# Patient Record
Sex: MALE | Race: WHITE | Employment: FULL TIME | URBAN - METROPOLITAN AREA
[De-identification: names, ages, dates, MRNs, and addresses within clinical notes are randomized per-mention and may not be internally consistent; named-entity substitution may affect disease eponyms.]

---

## 2020-10-21 ENCOUNTER — APPOINTMENT (OUTPATIENT)
Dept: GENERAL RADIOLOGY | Age: 30
End: 2020-10-21
Attending: EMERGENCY MEDICINE
Payer: OTHER GOVERNMENT

## 2020-10-21 ENCOUNTER — HOSPITAL ENCOUNTER (EMERGENCY)
Age: 30
Discharge: HOME | End: 2020-10-21
Attending: EMERGENCY MEDICINE
Payer: OTHER GOVERNMENT

## 2020-10-21 VITALS
RESPIRATION RATE: 19 BRPM | TEMPERATURE: 98.7 F | HEIGHT: 71 IN | DIASTOLIC BLOOD PRESSURE: 97 MMHG | OXYGEN SATURATION: 99 % | WEIGHT: 170 LBS | SYSTOLIC BLOOD PRESSURE: 146 MMHG | HEART RATE: 58 BPM | BODY MASS INDEX: 23.8 KG/M2

## 2020-10-21 DIAGNOSIS — R07.89 ATYPICAL CHEST PAIN: Primary | ICD-10-CM

## 2020-10-21 DIAGNOSIS — R07.89 CHEST WALL PAIN: ICD-10-CM

## 2020-10-21 LAB
ALBUMIN SERPL-MCNC: 4.1 G/DL (ref 3.5–5)
ALBUMIN/GLOB SERPL: 1.2 {RATIO} (ref 1.1–2.2)
ALP SERPL-CCNC: 53 U/L (ref 45–117)
ALT SERPL-CCNC: 21 U/L (ref 12–78)
ANION GAP SERPL CALC-SCNC: 7 MMOL/L (ref 5–15)
AST SERPL W P-5'-P-CCNC: 14 U/L (ref 15–37)
ATRIAL RATE: 80 BPM
BASOPHILS # BLD: 0 K/UL (ref 0–0.1)
BASOPHILS NFR BLD: 0 % (ref 0–1)
BILIRUB SERPL-MCNC: 0.2 MG/DL (ref 0.2–1)
BUN SERPL-MCNC: 13 MG/DL (ref 6–20)
BUN/CREAT SERPL: 12 (ref 12–20)
CA-I BLD-MCNC: 8.9 MG/DL (ref 8.5–10.1)
CALCULATED P AXIS, ECG09: 69 DEGREES
CALCULATED R AXIS, ECG10: 69 DEGREES
CALCULATED T AXIS, ECG11: 53 DEGREES
CHLORIDE SERPL-SCNC: 107 MMOL/L (ref 97–108)
CO2 SERPL-SCNC: 27 MMOL/L (ref 21–32)
CREAT SERPL-MCNC: 1.09 MG/DL (ref 0.7–1.3)
DIAGNOSIS, 93000: NORMAL
DIFFERENTIAL METHOD BLD: NORMAL
EOSINOPHIL # BLD: 0.1 K/UL (ref 0–0.4)
EOSINOPHIL NFR BLD: 2 % (ref 0–7)
ERYTHROCYTE [DISTWIDTH] IN BLOOD BY AUTOMATED COUNT: 12.1 % (ref 11.5–14.5)
GLOBULIN SER CALC-MCNC: 3.3 G/DL (ref 2–4)
GLUCOSE SERPL-MCNC: 87 MG/DL (ref 65–100)
HCT VFR BLD AUTO: 42.1 % (ref 36.6–50.3)
HGB BLD-MCNC: 14.5 G/DL (ref 12.1–17)
IMM GRANULOCYTES # BLD AUTO: 0 K/UL (ref 0–0.04)
IMM GRANULOCYTES NFR BLD AUTO: 0 % (ref 0–0.5)
LYMPHOCYTES # BLD: 2.5 K/UL (ref 0.8–3.5)
LYMPHOCYTES NFR BLD: 49 % (ref 12–49)
MCH RBC QN AUTO: 30.1 PG (ref 26–34)
MCHC RBC AUTO-ENTMCNC: 34.4 G/DL (ref 30–36.5)
MCV RBC AUTO: 87.3 FL (ref 80–99)
MONOCYTES # BLD: 0.3 K/UL (ref 0–1)
MONOCYTES NFR BLD: 6 % (ref 5–13)
NEUTS SEG # BLD: 2.2 K/UL (ref 1.8–8)
NEUTS SEG NFR BLD: 43 % (ref 32–75)
P-R INTERVAL, ECG05: 142 MS
PLATELET # BLD AUTO: 245 K/UL (ref 150–400)
PMV BLD AUTO: 9.9 FL (ref 8.9–12.9)
POTASSIUM SERPL-SCNC: 3.7 MMOL/L (ref 3.5–5.1)
PROT SERPL-MCNC: 7.4 G/DL (ref 6.4–8.2)
Q-T INTERVAL, ECG07: 342 MS
QRS DURATION, ECG06: 90 MS
QTC CALCULATION (BEZET), ECG08: 394 MS
RBC # BLD AUTO: 4.82 M/UL (ref 4.1–5.7)
SODIUM SERPL-SCNC: 141 MMOL/L (ref 136–145)
TROPONIN I SERPL-MCNC: <0.05 NG/ML
VENTRICULAR RATE, ECG03: 80 BPM
WBC # BLD AUTO: 5.2 K/UL (ref 4.1–11.1)

## 2020-10-21 PROCEDURE — 85025 COMPLETE CBC W/AUTO DIFF WBC: CPT

## 2020-10-21 PROCEDURE — 84484 ASSAY OF TROPONIN QUANT: CPT

## 2020-10-21 PROCEDURE — 99284 EMERGENCY DEPT VISIT MOD MDM: CPT

## 2020-10-21 PROCEDURE — 71045 X-RAY EXAM CHEST 1 VIEW: CPT

## 2020-10-21 PROCEDURE — 93005 ELECTROCARDIOGRAM TRACING: CPT

## 2020-10-21 PROCEDURE — 80053 COMPREHEN METABOLIC PANEL: CPT

## 2020-10-21 PROCEDURE — 36415 COLL VENOUS BLD VENIPUNCTURE: CPT

## 2020-10-21 NOTE — ED PROVIDER NOTES
EMERGENCY DEPARTMENT HISTORY AND PHYSICAL EXAM      Date: 10/21/2020  Patient Name: Ham Mukherjee    History of Presenting Illness     Chief Complaint   Patient presents with    Chest Pain       History Provided By: Patient    HPI: Ham Mukherjee, 27 y.o. male with a past medical history significant No significant past medical history presents to the ED with cc of patient has had a sharp pain in the left lateral chest wall just at the junction of the pectoralis muscle and the lower chest wall which radiates into towards the costochondral area with no shortness of breath no diaphoresis no nausea or vomiting. The pain does not increase with movement or with deep inspiration. Patient has no history of cardiac disease and no risk factors for coronary artery disease. There are no other complaints, changes, or physical findings at this time. PCP: No primary care provider on file. Past History     Past Medical History:  History reviewed. No pertinent past medical history. Past Surgical History:  History reviewed. No pertinent surgical history. Family History:  History reviewed. No pertinent family history. Social History:  Social History     Tobacco Use    Smoking status: Never Smoker    Smokeless tobacco: Current User   Substance Use Topics    Alcohol use: Not Currently    Drug use: Never       Allergies:  No Known Allergies      Review of Systems     Review of Systems   Constitutional: Negative for activity change, chills and fever. HENT: Negative for ear pain, nosebleeds, rhinorrhea and sore throat. Eyes: Negative for pain and visual disturbance. Respiratory: Positive for shortness of breath. Negative for cough, chest tightness and wheezing. Cardiovascular: Positive for chest pain. Negative for palpitations. Gastrointestinal: Negative for abdominal pain, diarrhea, nausea and vomiting. Endocrine: Negative for heat intolerance, polydipsia and polyuria.    Genitourinary: Negative for dysuria, frequency and hematuria. Musculoskeletal: Negative for back pain, joint swelling and neck pain. Skin: Negative for rash. Allergic/Immunologic: Negative. Neurological: Negative for dizziness, seizures, syncope, weakness and headaches. Hematological: Negative for adenopathy. Psychiatric/Behavioral: Negative for behavioral problems and suicidal ideas. The patient is not nervous/anxious. Physical Exam     Physical Exam  Vitals signs and nursing note reviewed. Constitutional:       Appearance: Normal appearance. HENT:      Head: Normocephalic and atraumatic. Nose: Nose normal.      Mouth/Throat:      Mouth: Mucous membranes are moist.   Eyes:      Extraocular Movements: Extraocular movements intact. Conjunctiva/sclera: Conjunctivae normal.      Pupils: Pupils are equal, round, and reactive to light. Neck:      Musculoskeletal: Normal range of motion and neck supple. Cardiovascular:      Rate and Rhythm: Normal rate. Pulses: Normal pulses. Pulmonary:      Effort: Pulmonary effort is normal.      Breath sounds: Normal breath sounds. Abdominal:      General: Abdomen is flat. Palpations: Abdomen is soft. Musculoskeletal: Normal range of motion. General: No swelling, tenderness or deformity. Skin:     General: Skin is warm and dry. Neurological:      General: No focal deficit present. Mental Status: He is alert and oriented to person, place, and time.    Psychiatric:         Mood and Affect: Mood normal.         Behavior: Behavior normal.         Diagnostic Study Results     Labs -     Recent Results (from the past 12 hour(s))   METABOLIC PANEL, COMPREHENSIVE    Collection Time: 10/21/20  2:15 AM   Result Value Ref Range    Sodium 141 136 - 145 mmol/L    Potassium 3.7 3.5 - 5.1 mmol/L    Chloride 107 97 - 108 mmol/L    CO2 27 21 - 32 mmol/L    Anion gap 7 5 - 15 mmol/L    Glucose 87 65 - 100 mg/dL    BUN 13 6 - 20 mg/dL    Creatinine 1. 09 0.70 - 1.30 mg/dL    BUN/Creatinine ratio 12 12 - 20      GFR est AA >60 >60 ml/min/1.73m2    GFR est non-AA >60 >60 ml/min/1.73m2    Calcium 8.9 8.5 - 10.1 mg/dL    Bilirubin, total 0.2 0.2 - 1.0 mg/dL    AST (SGOT) 14 (L) 15 - 37 U/L    ALT (SGPT) 21 12 - 78 U/L    Alk. phosphatase 53 45 - 117 U/L    Protein, total 7.4 6.4 - 8.2 g/dL    Albumin 4.1 3.5 - 5.0 g/dL    Globulin 3.3 2.0 - 4.0 g/dL    A-G Ratio 1.2 1.1 - 2.2     TROPONIN I    Collection Time: 10/21/20  2:15 AM   Result Value Ref Range    Troponin-I, Qt. <0.05 <0.05 ng/mL       Radiologic Studies -   XR Results (most recent):  Results from Hospital Encounter encounter on 10/21/20   XR CHEST SNGL V    Narrative XR CHEST SNGL V    Comparison:  None available    Single view: The lungs are clear. No pneumothorax or pleural effusion apparent. The cardiomediastinum is unremarkable. There is no evidence of acute cardiac  decompensation. Impression IMPRESSION:  No evidence of an acute cardiopulmonary process. CT Results  (Last 48 hours)    None        CXR Results  (Last 48 hours)               10/21/20 0233  XR CHEST SNGL V Final result    Impression:  IMPRESSION:  No evidence of an acute cardiopulmonary process. Narrative:  XR CHEST SNGL V       Comparison:  None available       Single view: The lungs are clear. No pneumothorax or pleural effusion apparent. The cardiomediastinum is unremarkable. There is no evidence of acute cardiac   decompensation. Medical Decision Making and ED Course   I am the first provider for this patient. Provider Notes:   DDx includes STEMI, NSTEMI, Angina, PE, Aortic Pathology, Chest Wall Pain, Pleurisy, Pneumonia, GERD/esophagitis, Anxiety. No cough/fever or focal lung findings to suggest pneumonia. No tachycardia, hypoxia or pleuritic component to suggest PE. Pulses symmetric and no extremely elevated BP/asymmetry or classic tearing sensation to suggest Aortic Dissection. Also, no neuro findings. No wretching/forceful vomiting to suggest esophageal disaster. Denies IV drug abuse, has native valves, no fevers/murmurs or skin lesions to suggest endocarditis. Will evaluate with EKG, labs, cardiac enzymes, chest x-ray. Will provide pain control and reassess. I reviewed the vital signs, available nursing notes, past medical history, past surgical history, family history and social history. Vital Signs-Reviewed the patient's vital signs. Patient Vitals for the past 12 hrs:   Temp Pulse Resp BP SpO2   10/21/20 0208 98.7 °F (37.1 °C) 71 18 (!) 163/106 97 %     ED EKG interpretation:  Rhythm: normal sinus rhythm; and regular . Rate (approx.): 80; Axis: normal; P wave: normal; QRS interval: normal ; ST/T wave: normal; Other findings: normal. This EKG was interpreted by Mari Anderson MD    Records Reviewed: Nursing Notes    ED Course:   Initial assessment performed. The patients presenting problems have been discussed, and they are in agreement with the care plan formulated and outlined with them. I have encouraged them to ask questions as they arise throughout their visit. Disposition     Discharged      Diagnosis     Clinical Impression:   Encounter Diagnoses     ICD-10-CM ICD-9-CM   1. Atypical chest pain  R07.89 786.59   2. Chest wall pain  R07.89 786.52        Attestations:    Mamie Armijo MD    Please note that this dictation was completed with [employer], the computer voice recognition software. Quite often unanticipated grammatical, syntax, homophones, and other interpretive errors are inadvertently transcribed by the computer software. Please disregard these errors. Please excuse any errors that have escaped final proofreading. Thank you.

## 2022-04-05 ENCOUNTER — APPOINTMENT (OUTPATIENT)
Dept: GENERAL RADIOLOGY | Facility: HOSPITAL | Age: 32
End: 2022-04-05

## 2022-04-05 ENCOUNTER — HOSPITAL ENCOUNTER (EMERGENCY)
Facility: HOSPITAL | Age: 32
Discharge: HOME | End: 2022-04-05
Attending: EMERGENCY MEDICINE | Admitting: EMERGENCY MEDICINE

## 2022-04-05 VITALS
HEIGHT: 71 IN | DIASTOLIC BLOOD PRESSURE: 87 MMHG | RESPIRATION RATE: 18 BRPM | HEART RATE: 73 BPM | BODY MASS INDEX: 24.81 KG/M2 | WEIGHT: 177.25 LBS | SYSTOLIC BLOOD PRESSURE: 140 MMHG | OXYGEN SATURATION: 96 % | TEMPERATURE: 97.1 F

## 2022-04-05 DIAGNOSIS — K21.9 GASTROESOPHAGEAL REFLUX DISEASE, UNSPECIFIED WHETHER ESOPHAGITIS PRESENT: ICD-10-CM

## 2022-04-05 DIAGNOSIS — R07.9 CHEST PAIN, UNSPECIFIED TYPE: Primary | ICD-10-CM

## 2022-04-05 LAB
ALBUMIN SERPL-MCNC: 5.1 G/DL (ref 3.5–5.2)
ALBUMIN/GLOB SERPL: 2 G/DL
ALP SERPL-CCNC: 48 U/L (ref 39–117)
ALT SERPL W P-5'-P-CCNC: 23 U/L (ref 1–41)
ANION GAP SERPL CALCULATED.3IONS-SCNC: 11.4 MMOL/L (ref 5–15)
AST SERPL-CCNC: 18 U/L (ref 1–40)
BASOPHILS # BLD AUTO: 0.03 10*3/MM3 (ref 0–0.2)
BASOPHILS NFR BLD AUTO: 0.4 % (ref 0–1.5)
BILIRUB SERPL-MCNC: 0.3 MG/DL (ref 0–1.2)
BUN SERPL-MCNC: 21 MG/DL (ref 6–20)
BUN/CREAT SERPL: 19.4 (ref 7–25)
CALCIUM SPEC-SCNC: 10.1 MG/DL (ref 8.6–10.5)
CHLORIDE SERPL-SCNC: 103 MMOL/L (ref 98–107)
CO2 SERPL-SCNC: 23.6 MMOL/L (ref 22–29)
CREAT SERPL-MCNC: 1.08 MG/DL (ref 0.76–1.27)
DEPRECATED RDW RBC AUTO: 36.5 FL (ref 37–54)
EGFRCR SERPLBLD CKD-EPI 2021: 94.1 ML/MIN/1.73
EOSINOPHIL # BLD AUTO: 0.15 10*3/MM3 (ref 0–0.4)
EOSINOPHIL NFR BLD AUTO: 2.1 % (ref 0.3–6.2)
ERYTHROCYTE [DISTWIDTH] IN BLOOD BY AUTOMATED COUNT: 11.5 % (ref 12.3–15.4)
GLOBULIN UR ELPH-MCNC: 2.5 GM/DL
GLUCOSE SERPL-MCNC: 103 MG/DL (ref 65–99)
HCT VFR BLD AUTO: 40.6 % (ref 37.5–51)
HGB BLD-MCNC: 14.6 G/DL (ref 13–17.7)
IMM GRANULOCYTES # BLD AUTO: 0.01 10*3/MM3 (ref 0–0.05)
IMM GRANULOCYTES NFR BLD AUTO: 0.1 % (ref 0–0.5)
LYMPHOCYTES # BLD AUTO: 2.85 10*3/MM3 (ref 0.7–3.1)
LYMPHOCYTES NFR BLD AUTO: 39.4 % (ref 19.6–45.3)
MCH RBC QN AUTO: 31.1 PG (ref 26.6–33)
MCHC RBC AUTO-ENTMCNC: 36 G/DL (ref 31.5–35.7)
MCV RBC AUTO: 86.4 FL (ref 79–97)
MONOCYTES # BLD AUTO: 0.53 10*3/MM3 (ref 0.1–0.9)
MONOCYTES NFR BLD AUTO: 7.3 % (ref 5–12)
NEUTROPHILS NFR BLD AUTO: 3.66 10*3/MM3 (ref 1.7–7)
NEUTROPHILS NFR BLD AUTO: 50.7 % (ref 42.7–76)
NRBC BLD AUTO-RTO: 0 /100 WBC (ref 0–0.2)
PLATELET # BLD AUTO: 249 10*3/MM3 (ref 140–450)
PMV BLD AUTO: 8.8 FL (ref 6–12)
POTASSIUM SERPL-SCNC: 4 MMOL/L (ref 3.5–5.2)
PROT SERPL-MCNC: 7.6 G/DL (ref 6–8.5)
QT INTERVAL: 342 MS
RBC # BLD AUTO: 4.7 10*6/MM3 (ref 4.14–5.8)
SODIUM SERPL-SCNC: 138 MMOL/L (ref 136–145)
TROPONIN T SERPL-MCNC: <0.01 NG/ML (ref 0–0.03)
WBC NRBC COR # BLD: 7.23 10*3/MM3 (ref 3.4–10.8)

## 2022-04-05 PROCEDURE — 71045 X-RAY EXAM CHEST 1 VIEW: CPT

## 2022-04-05 PROCEDURE — 80053 COMPREHEN METABOLIC PANEL: CPT | Performed by: EMERGENCY MEDICINE

## 2022-04-05 PROCEDURE — 99282 EMERGENCY DEPT VISIT SF MDM: CPT

## 2022-04-05 PROCEDURE — 85025 COMPLETE CBC W/AUTO DIFF WBC: CPT | Performed by: EMERGENCY MEDICINE

## 2022-04-05 PROCEDURE — 93010 ELECTROCARDIOGRAM REPORT: CPT | Performed by: INTERNAL MEDICINE

## 2022-04-05 PROCEDURE — 36415 COLL VENOUS BLD VENIPUNCTURE: CPT | Performed by: EMERGENCY MEDICINE

## 2022-04-05 PROCEDURE — 93005 ELECTROCARDIOGRAM TRACING: CPT

## 2022-04-05 PROCEDURE — 93005 ELECTROCARDIOGRAM TRACING: CPT | Performed by: EMERGENCY MEDICINE

## 2022-04-05 PROCEDURE — 84484 ASSAY OF TROPONIN QUANT: CPT

## 2022-04-05 RX ORDER — LISINOPRIL 20 MG/1
20 TABLET ORAL DAILY
COMMUNITY

## 2022-04-05 RX ORDER — FAMOTIDINE 20 MG/1
20 TABLET, FILM COATED ORAL 2 TIMES DAILY
Qty: 30 TABLET | Refills: 0 | Status: SHIPPED | OUTPATIENT
Start: 2022-04-05

## 2022-04-05 NOTE — ED PROVIDER NOTES
"Time: 2:54 AM EDT  Arrived by: Private car  Chief Complaint: Chest pain    History of Present Illness:    Vicente Coker is a 31 y.o. male who presents to the emergency department today with complaints of moderate chest pain. The patient reports that for the past two weeks, he has struggled with intermittent pains to his left chest. He describes his pains as being sharp and burning in nature at its worst, but adds that it is occasionally dull when it is mild. His pain will occasionally \"shoot up,\" though he emphasizes that this is rare. He denies any known modifying factors including exercise or eating. He denies trying any medications at home for his symptoms.    The patient states that he planned to go to urgent care tomorrow for his chest pain. This evening, the patient notes that he had an extended episode of pain while lying in bed. He then decided to come to the ED instead for further evaluation.     The patient denies any shortness of breath, nausea, or vomiting with his chest pain.    The patient has a medical history of hypertension. He denies a family history of heart disease. He is a current smoker but denies alcohol or drug use. There are no other acute complaints at this time.           History provided by:  Patient   used: No    Chest Pain  Pain location:  L chest  Pain quality: burning, dull and sharp    Pain quality comment:  \"pain\"  Radiates to: Patient states his pain will occasionally \"shoot up\" but states that this is rare.  Pain severity:  Moderate  Onset quality:  Gradual  Duration:  2 weeks  Timing:  Intermittent  Progression:  Unchanged  Chronicity:  New  Context comment:  Patient has been having chest pains at random for two weeks.  Relieved by:  None tried  Worsened by:  Nothing  Ineffective treatments:  None tried  Associated symptoms: no back pain, no cough, no fever, no shortness of breath and no vomiting    Risk factors: hypertension, male sex and smoking    Risk " "factors: no coronary artery disease        Similar Symptoms Previously: No.  Recently seen: No recent visits on file.      Patient Care Team  Primary Care Provider: Provider, No Known    Past Medical History:     No Known Allergies  Past Medical History:   Diagnosis Date   • Hypertension      History reviewed. No pertinent surgical history.  History reviewed. No pertinent family history.    Home Medications:  Prior to Admission medications    Medication Sig Start Date End Date Taking? Authorizing Provider   lisinopril (PRINIVIL,ZESTRIL) 20 MG tablet Take 20 mg by mouth Daily.    Provider, Historical, MD        Social History:   Social History     Tobacco Use   • Smoking status: Current Every Day Smoker     Packs/day: 0.50     Types: Cigarettes   Substance Use Topics   • Alcohol use: Not Currently   • Drug use: Never       Record Review:  I have reviewed the patient's records in [employer].     Review of Systems:  Review of Systems   Constitutional: Negative for chills and fever.   HENT: Negative for nosebleeds.    Eyes: Negative for redness.   Respiratory: Negative for cough and shortness of breath.    Cardiovascular: Positive for chest pain.   Gastrointestinal: Negative for diarrhea and vomiting.   Genitourinary: Negative for dysuria and frequency.   Musculoskeletal: Negative for back pain and neck pain.   Skin: Negative for rash.   Neurological: Negative for seizures.   All other systems reviewed and are negative.       Physical Exam:  /87 (BP Location: Right leg)   Pulse 73   Temp 97.1 °F (36.2 °C) (Oral)   Resp 18   Ht 180.3 cm (71\")   Wt 80.4 kg (177 lb 4 oz)   SpO2 96%   BMI 24.72 kg/m²     Physical Exam  Vitals and nursing note reviewed.   Constitutional:       General: He is not in acute distress.  HENT:      Head: Normocephalic and atraumatic.      Nose: Nose normal.      Mouth/Throat:      Mouth: Mucous membranes are moist.   Eyes:      General: No scleral icterus.  Cardiovascular:      Rate and " Rhythm: Normal rate and regular rhythm.      Heart sounds: Normal heart sounds. No murmur heard.  Pulmonary:      Effort: No respiratory distress.      Breath sounds: Normal breath sounds.   Abdominal:      Palpations: Abdomen is soft.      Tenderness: There is no abdominal tenderness.   Musculoskeletal:         General: No tenderness. Normal range of motion.      Cervical back: Normal range of motion and neck supple.      Right lower leg: No edema.      Left lower leg: No edema.   Skin:     General: Skin is warm and dry.   Neurological:      Mental Status: He is alert. Mental status is at baseline.   Psychiatric:         Behavior: Behavior normal.                Medications in the Emergency Department:  Medications - No data to display     Labs  Lab Results (last 24 hours)     Procedure Component Value Units Date/Time    CBC & Differential [[id number]]  (Abnormal) Collected: 04/05/22 0105    Specimen: Blood Updated: 04/05/22 0111    Narrative:      The following orders were created for panel order CBC & Differential.  Procedure                               Abnormality         Status                     ---------                               -----------         ------                     CBC Auto Differential[[id number]]        Abnormal            Final result                 Please view results for these tests on the individual orders.    Comprehensive Metabolic Panel [[id number]]  (Abnormal) Collected: 04/05/22 0105    Specimen: Blood Updated: 04/05/22 0131     Glucose 103 mg/dL      BUN 21 mg/dL      Creatinine 1.08 mg/dL      Sodium 138 mmol/L      Potassium 4.0 mmol/L      Chloride 103 mmol/L      CO2 23.6 mmol/L      Calcium 10.1 mg/dL      Total Protein 7.6 g/dL      Albumin 5.10 g/dL      ALT (SGPT) 23 U/L      AST (SGOT) 18 U/L      Alkaline Phosphatase 48 U/L      Total Bilirubin 0.3 mg/dL      Globulin 2.5 gm/dL      A/G Ratio 2.0 g/dL      BUN/Creatinine Ratio 19.4     Anion Gap 11.4 mmol/L      eGFR 94.1  mL/min/1.73      Comment: National Kidney Foundation and American Society of Nephrology (ASN) Task Force recommended calculation based on the Chronic Kidney Disease Epidemiology Collaboration (CKD-EPI) equation refit without adjustment for race.       Narrative:      GFR Normal >60  Chronic Kidney Disease <60  Kidney Failure <15      Troponin [[id number]]  (Normal) Collected: 04/05/22 0105    Specimen: Blood Updated: 04/05/22 0130     Troponin T <0.010 ng/mL     Narrative:      Troponin T Reference Range:  <= 0.03 ng/mL-   Negative for AMI  >0.03 ng/mL-     Abnormal for myocardial necrosis.  Clinicians would have to utilize clinical acumen, EKG, Troponin and serial changes to determine if it is an Acute Myocardial Infarction or myocardial injury due to an underlying chronic condition.       Results may be falsely decreased if patient taking Biotin.      CBC Auto Differential [[id number]]  (Abnormal) Collected: 04/05/22 0105    Specimen: Blood Updated: 04/05/22 0111     WBC 7.23 10*3/mm3      RBC 4.70 10*6/mm3      Hemoglobin 14.6 g/dL      Hematocrit 40.6 %      MCV 86.4 fL      MCH 31.1 pg      MCHC 36.0 g/dL      RDW 11.5 %      RDW-SD 36.5 fl      MPV 8.8 fL      Platelets 249 10*3/mm3      Neutrophil % 50.7 %      Lymphocyte % 39.4 %      Monocyte % 7.3 %      Eosinophil % 2.1 %      Basophil % 0.4 %      Immature Grans % 0.1 %      Neutrophils, Absolute 3.66 10*3/mm3      Lymphocytes, Absolute 2.85 10*3/mm3      Monocytes, Absolute 0.53 10*3/mm3      Eosinophils, Absolute 0.15 10*3/mm3      Basophils, Absolute 0.03 10*3/mm3      Immature Grans, Absolute 0.01 10*3/mm3      nRBC 0.0 /100 WBC            Imaging:  XR Chest 1 View    Result Date: 4/5/2022  PROCEDURE: XR CHEST 1 VW  COMPARISON: None  INDICATIONS: CHEST PAINS, TODAY  FINDINGS:  Heart size within normal limits.  Lungs are without consolidation.  No pneumothorax or focal consolidation.  Bony thorax grossly intact.       No acute process.       DEVANG  MD TIKA       Electronically Signed and Approved By: DEVANG VILLELA MD on 4/05/2022 at 1:39               Procedures:  Procedures     EKG:    Rhythm: Normal sinus rhythm.  Rate: 93.  Normal P waves.  Normal QRS.  Normal ST segment.  Normal QT.    EKG Comparison: N/A    Interpreted by me     Progress                            Medical Decision Making:  MDM   HEART SCORE  History: Slightly Suspicious (0)  ECG: Normal (0)  Age: Less than 45 years (0)  Risk factors: No Risk Factors (0)  Troponin: normal (0)    This patient's HEART score is 0.    According to the HEART Score Study: Score (Risk of adverse cardiac event in the next 14 days)  Scores 0-3: (0.9-1.7%) In the HEART Score study, these patients were discharged home.  Scores 4-6: (12-16.6%)  In the HEART Score study, these patients were admitted to the hospital.  Scores ?7: (50-65%) In the HEART Score study, these patients were candidates for early invasive measures.   Final disposition is based on individual provider judgement and current clinical situation.    Patient has low risk heart score.  Nonischemic ECG.  Chest x-ray shows no acute cardiopulmonary process.  Patient is pain-free in the emergency department.  The patient´s CBC was reviewed and shows no abnormalities of critical concern. Of note, there is no anemia requiring a blood transfusion and the platelet count is acceptable.  The patient´s CMP was reviewed and shows no abnormalities of critical concern. Of note, the patient´s sodium and potassium are acceptable. The patient´s liver enzymes are unremarkable. The patient´s renal function (creatinine) is preserved. The patient has a normal anion gap.  Patient provided with contact information for cardiology for close follow-up.  We discussed return precautions including worsening symptoms or any additional concerns.  Final diagnoses:   Chest pain, unspecified type   Gastroesophageal reflux disease, unspecified whether esophagitis present         Disposition:  ED Disposition     ED Disposition   Discharge    Condition   Stable    Comment   --             Dictated Utilizing Dragon Dictation    Documentation assistance provided by Queenie Ferrer acting as scribe for Franco Mcghee MD. Information recorded by the scribe was done at my direction and has been verified and validated by me.      Queenie Ferrer  04/05/22 0258       Franco Mcghee MD  04/05/22 0627